# Patient Record
Sex: MALE | URBAN - NONMETROPOLITAN AREA
[De-identification: names, ages, dates, MRNs, and addresses within clinical notes are randomized per-mention and may not be internally consistent; named-entity substitution may affect disease eponyms.]

---

## 2021-06-14 ENCOUNTER — APPOINTMENT (RX ONLY)
Dept: URBAN - NONMETROPOLITAN AREA CLINIC 9 | Facility: CLINIC | Age: 56
Setting detail: DERMATOLOGY
End: 2021-06-14

## 2021-06-14 VITALS — SYSTOLIC BLOOD PRESSURE: 170 MMHG | DIASTOLIC BLOOD PRESSURE: 90 MMHG

## 2021-06-14 DIAGNOSIS — F17.200 NICOTINE DEPENDENCE, UNSPECIFIED, UNCOMPLICATED: ICD-10-CM

## 2021-06-14 DIAGNOSIS — D485 NEOPLASM OF UNCERTAIN BEHAVIOR OF SKIN: ICD-10-CM | Status: INADEQUATELY CONTROLLED

## 2021-06-14 DIAGNOSIS — R03.0 ELEVATED BLOOD-PRESSURE READING, WITHOUT DIAGNOSIS OF HYPERTENSION: ICD-10-CM

## 2021-06-14 PROBLEM — D48.5 NEOPLASM OF UNCERTAIN BEHAVIOR OF SKIN: Status: ACTIVE | Noted: 2021-06-14

## 2021-06-14 PROCEDURE — ? INCISIONAL BIOPSY

## 2021-06-14 PROCEDURE — 11106 INCAL BX SKN SINGLE LES: CPT

## 2021-06-14 PROCEDURE — 99202 OFFICE O/P NEW SF 15 MIN: CPT | Mod: 25

## 2021-06-14 PROCEDURE — ? COUNSELING

## 2021-06-14 ASSESSMENT — LOCATION ZONE DERM: LOCATION ZONE: ARM

## 2021-06-14 ASSESSMENT — LOCATION SIMPLE DESCRIPTION DERM: LOCATION SIMPLE: LEFT FOREARM

## 2021-06-14 ASSESSMENT — LOCATION DETAILED DESCRIPTION DERM: LOCATION DETAILED: LEFT DISTAL DORSAL FOREARM

## 2021-06-14 NOTE — PROCEDURE: INCISIONAL BIOPSY
Detail Level: Detailed
Scalpel Type: 15 blade
Biopsy Type: H and E
Depth Of Biopsy: adipose tissue
Was An Eye Clamp Used?: No
Eye Clamp Note Details: An eye clamp was used during the procedure.
Anesthesia Type: 0.5% lidocaine with 1:100,000 epinephrine and a 1:10 solution of 8.4% sodium bicarbonate
Anesthesia Volume In Cc: 1.5
Hemostasis: Pressure
Epidermal Sutures: 4-0 Nylon
Wound Care: Petrolatum
Suture Removal: 7 days
Size Of Lesion In Cm (Optional): 0
Lab: [id number]
Lab Facility: [id number]
Path Notes (To The Dermatopathologist): Please section longitudinally.  DO NOT BREADLOAF.  Patient woke one morning about 1 month ago with what he thought was a spider bite. Today it appears to be an SCC.
Consent: Written consent was obtained and risks were reviewed including but not limited to scarring, infection, bleeding, scabbing, incomplete removal, nerve damage and allergy to anesthesia.
Post-Care Instructions: I reviewed with the patient in detail post-care instructions. Patient is to keep the biopsy site dry overnight, and then apply bacitracin twice daily until healed. Patient may apply hydrogen peroxide soaks to remove any crusting.
Notification Instructions: Patient will be notified of biopsy results. However, patient instructed to call the office if not contacted within 2 weeks.
Billing Type: Third-Party Bill

## 2021-06-14 NOTE — HPI: RASH (SPIDER BITE)
How Severe Is It?: moderate
Is This A New Presentation, Or A Follow-Up?: Rash
Additional History: When there was a scab in the area it was painful but now that the scab has come off it doesn’t hurt.  He attempted to drain early in after the bite but never expressed anything from the site.

## 2021-06-21 ENCOUNTER — APPOINTMENT (RX ONLY)
Dept: URBAN - NONMETROPOLITAN AREA CLINIC 9 | Facility: CLINIC | Age: 56
Setting detail: DERMATOLOGY
End: 2021-06-21

## 2021-06-21 DIAGNOSIS — Z48.817 ENCOUNTER FOR SURGICAL AFTERCARE FOLLOWING SURGERY ON THE SKIN AND SUBCUTANEOUS TISSUE: ICD-10-CM

## 2021-06-21 PROBLEM — C44.629 SQUAMOUS CELL CARCINOMA OF SKIN OF LEFT UPPER LIMB, INCLUDING SHOULDER: Status: ACTIVE | Noted: 2021-06-21

## 2021-06-21 PROCEDURE — ? CONSULTATION FOR MOHS SURGERY

## 2021-06-21 PROCEDURE — 99214 OFFICE O/P EST MOD 30 MIN: CPT

## 2021-06-21 PROCEDURE — ? PRESCRIPTION

## 2021-06-21 PROCEDURE — ? POST-OP WOUND CHECK (NO GLOBAL PERIOD)

## 2021-06-21 RX ORDER — CEPHALEXIN 500 MG/1
CAPSULE ORAL
Qty: 4 | Refills: 0 | Status: ERX | COMMUNITY
Start: 2021-06-21

## 2021-06-21 RX ADMIN — CEPHALEXIN: 500 CAPSULE ORAL at 00:00

## 2021-06-21 ASSESSMENT — LOCATION SIMPLE DESCRIPTION DERM: LOCATION SIMPLE: LEFT FOREARM

## 2021-06-21 ASSESSMENT — LOCATION DETAILED DESCRIPTION DERM: LOCATION DETAILED: LEFT DISTAL DORSAL FOREARM

## 2021-06-21 ASSESSMENT — LOCATION ZONE DERM: LOCATION ZONE: ARM

## 2021-06-21 NOTE — PROCEDURE: CONSULTATION FOR MOHS SURGERY
Detail Level: Detailed
Size Of Lesion: 3
X Size Of Lesion In Cm (Optional): 0
Date Scheduled For Mohs (Optional): 6-23-21
Incorporate Mauc In Note: Yes

## 2021-06-23 ENCOUNTER — APPOINTMENT (RX ONLY)
Dept: URBAN - NONMETROPOLITAN AREA CLINIC 9 | Facility: CLINIC | Age: 56
Setting detail: DERMATOLOGY
End: 2021-06-23

## 2021-06-23 VITALS — SYSTOLIC BLOOD PRESSURE: 162 MMHG | DIASTOLIC BLOOD PRESSURE: 84 MMHG | HEART RATE: 82 BPM

## 2021-06-23 PROBLEM — C44.629 SQUAMOUS CELL CARCINOMA OF SKIN OF LEFT UPPER LIMB, INCLUDING SHOULDER: Status: ACTIVE | Noted: 2021-06-23

## 2021-06-23 PROCEDURE — 14302 TIS TRNFR ADDL 30 SQ CM: CPT

## 2021-06-23 PROCEDURE — 15220 FTH/GFT FR S/A/L 20 SQ CM/<: CPT

## 2021-06-23 PROCEDURE — 17313 MOHS 1 STAGE T/A/L: CPT

## 2021-06-23 PROCEDURE — 14301 TIS TRNFR ANY 30.1-60 SQ CM: CPT

## 2021-06-23 PROCEDURE — ? MOHS SURGERY

## 2021-06-23 NOTE — PROCEDURE: MOHS SURGERY
Body Location Override (Optional - Billing Will Still Be Based On Selected Body Map Location If Applicable): left distal dorsal forearm
Mohs Case Number: [id number]
Date Of Previous Biopsy (Optional): 06-04-21
Previous Accession (Optional): [id number]
Biopsy Photograph Reviewed: Yes
Consent Type: Consent 1 (Standard)
Eye Shield Used: No
Surgeon Performing Repair (Optional): Jeovany Cueva MD
Initial Size Of Lesion: 2.3
X Size Of Lesion In Cm (Optional): 2.9
Number Of Stages: 1
Primary Defect Length In Cm (Final Defect Size - Required For Flaps/Grafts): 2.8
Primary Defect Width In Cm (Final Defect Size - Required For Flaps/Grafts): 3.4
Repair Type: Flap and Graft
Oculoplastic Surgeon Procedure Text (A): After obtaining clear surgical margins the patient was sent to oculoplastics for surgical repair.  The patient understands they will receive post-surgical care and follow-up from the referring physician's office.
Otolaryngologist Procedure Text (A): After obtaining clear surgical margins the patient was sent to otolaryngology for surgical repair.  The patient understands they will receive post-surgical care and follow-up from the referring physician's office.
Plastic Surgeon Procedure Text (A): After obtaining clear surgical margins the patient was sent to plastics for surgical repair.  The patient understands they will receive post-surgical care and follow-up from the referring physician's office.
Mid-Level Procedure Text (A): After obtaining clear surgical margins the patient was sent to a mid-level provider for surgical repair.  The patient understands they will receive post-surgical care and follow-up from the mid-level provider.
Provider Procedure Text (A): After obtaining clear surgical margins the defect was repaired by another provider.
Asc Procedure Text (A): After obtaining clear surgical margins the patient was sent to an ASC for surgical repair.  The patient understands they will receive post-surgical care and follow-up from the ASC physician.
Suturegard Retention Suture: 2-0 Nylon
Retention Suture Bite Size: 3 mm
Length To Time In Minutes Device Was In Place: 10
Simple / Intermediate / Complex Repair - Final Wound Length In Cm: 0
Undermining Type: Entire Wound
Debridement Text: The wound edges were debrided prior to proceeding with the closure to facilitate wound healing.
Helical Rim Text: The closure involved the helical rim.
Vermilion Border Text: The closure involved the vermilion border.
Nostril Rim Text: The closure involved the nostril rim.
Retention Suture Text: Retention sutures were placed to support the closure and prevent dehiscence.
Flap Type: O-Z Flap
Secondary Defect Length In Cm (Required For Flaps): 6.4
Secondary Defect Width In Cm (Required For Flaps): 9.3
Additional Graft Type: Full Thickness Skin Graft
Additional Primary Defect Length (In Cm): 1.5
Additional Primary Defect Width (In Cm): 2.1
Patient Specific Indications Override: rapid onset and growth.
Area H Indication Text: Tumors in this location are included in Area H (eyelids, eyebrows, nose, lips, chin, ear, pre-auricular, post-auricular, temple, genitalia, hands, feet, ankles and areola).  Tissue conservation is critical in these anatomic locations.
Area M Indication Text: Tumors in this location are included in Area M (cheek, forehead, scalp, neck, jawline and pretibial skin).  Mohs surgery is indicated for tumors in these anatomic locations.
Area L Indication Text: Tumors in this location are included in Area L (trunk and extremities).  Mohs surgery is indicated for larger tumors, or tumors with aggressive histologic features, in these anatomic locations.
Tumor Debulked?: not debulked
Depth Of Tumor Invasion (For Histology): adipose tissue
Perineural Invasion (For Histology - Be Specific If Possible): absent
Stage 1: Number Of Blocks?: 4
Special Stains Stage 1 - Results: Base On Clearance Noted Above
Stage 2: Additional Anesthesia Type: 1% lidocaine with epinephrine
Staging Info: By selecting yes to the question above you will include information on AJCC 8 tumor staging in your Mohs note. Information on tumor staging will be automatically added for SCCs on the head and neck. AJCC 8 includes tumor size, tumor depth, perineural involvement and bone invasion.
Tumor Depth: Less than 6mm from granular layer and no invasion beyond the subcutaneous fat
Was The Patient On Physician Recommended Anticoagulation Therapy?: Please Select the Appropriate Response
Medical Necessity Statement: Based on my medical judgement, Mohs surgery is the most appropriate treatment for this cancer compared to other treatments.
Alternatives Discussed Intro (Do Not Add Period): I discussed alternative treatments to Mohs surgery and specifically discussed the risks and benefits of
Consent 1/Introductory Paragraph: The rationale for Mohs was explained to the patient and consent was obtained. The risks, benefits and alternatives to therapy were discussed in detail. Specifically, the risks of infection, scarring, bleeding, prolonged wound healing, incomplete removal, allergy to anesthesia, nerve injury and recurrence were addressed. Prior to the procedure, the treatment site was clearly identified and confirmed by the patient. All components of Universal Protocol/PAUSE Rule completed.
Consent 2/Introductory Paragraph: Mohs surgery was explained to the patient and consent was obtained. The risks, benefits and alternatives to therapy were discussed in detail. Specifically, the risks of infection, scarring, bleeding, prolonged wound healing, incomplete removal, allergy to anesthesia, nerve injury and recurrence were addressed. Prior to the procedure, the treatment site was clearly identified and confirmed by the patient. All components of Universal Protocol/PAUSE Rule completed.
Consent 3/Introductory Paragraph: I gave the patient a chance to ask questions they had about the procedure.  Following this I explained the Mohs procedure and consent was obtained. The risks, benefits and alternatives to therapy were discussed in detail. Specifically, the risks of infection, scarring, bleeding, prolonged wound healing, incomplete removal, allergy to anesthesia, nerve injury and recurrence were addressed. Prior to the procedure, the treatment site was clearly identified and confirmed by the patient. All components of Universal Protocol/PAUSE Rule completed.
Consent (Temporal Branch)/Introductory Paragraph: The rationale for Mohs was explained to the patient and consent was obtained. The risks, benefits and alternatives to therapy were discussed in detail. Specifically, the risks of damage to the temporal branch of the facial nerve, infection, scarring, bleeding, prolonged wound healing, incomplete removal, allergy to anesthesia, and recurrence were addressed. Prior to the procedure, the treatment site was clearly identified and confirmed by the patient. All components of Universal Protocol/PAUSE Rule completed.
Consent (Marginal Mandibular)/Introductory Paragraph: The rationale for Mohs was explained to the patient and consent was obtained. The risks, benefits and alternatives to therapy were discussed in detail. Specifically, the risks of damage to the marginal mandibular branch of the facial nerve, infection, scarring, bleeding, prolonged wound healing, incomplete removal, allergy to anesthesia, and recurrence were addressed. Prior to the procedure, the treatment site was clearly identified and confirmed by the patient. All components of Universal Protocol/PAUSE Rule completed.
Consent (Spinal Accessory)/Introductory Paragraph: The rationale for Mohs was explained to the patient and consent was obtained. The risks, benefits and alternatives to therapy were discussed in detail. Specifically, the risks of damage to the spinal accessory nerve, infection, scarring, bleeding, prolonged wound healing, incomplete removal, allergy to anesthesia, and recurrence were addressed. Prior to the procedure, the treatment site was clearly identified and confirmed by the patient. All components of Universal Protocol/PAUSE Rule completed.
Consent (Near Eyelid Margin)/Introductory Paragraph: The rationale for Mohs was explained to the patient and consent was obtained. The risks, benefits and alternatives to therapy were discussed in detail. Specifically, the risks of ectropion or eyelid deformity, infection, scarring, bleeding, prolonged wound healing, incomplete removal, allergy to anesthesia, nerve injury and recurrence were addressed. Prior to the procedure, the treatment site was clearly identified and confirmed by the patient. All components of Universal Protocol/PAUSE Rule completed.
Consent (Ear)/Introductory Paragraph: The rationale for Mohs was explained to the patient and consent was obtained. The risks, benefits and alternatives to therapy were discussed in detail. Specifically, the risks of ear deformity, infection, scarring, bleeding, prolonged wound healing, incomplete removal, allergy to anesthesia, nerve injury and recurrence were addressed. Prior to the procedure, the treatment site was clearly identified and confirmed by the patient. All components of Universal Protocol/PAUSE Rule completed.
Consent (Nose)/Introductory Paragraph: The rationale for Mohs was explained to the patient and consent was obtained. The risks, benefits and alternatives to therapy were discussed in detail. Specifically, the risks of nasal deformity, changes in the flow of air through the nose, infection, scarring, bleeding, prolonged wound healing, incomplete removal, allergy to anesthesia, nerve injury and recurrence were addressed. Prior to the procedure, the treatment site was clearly identified and confirmed by the patient. All components of Universal Protocol/PAUSE Rule completed.
Consent (Lip)/Introductory Paragraph: The rationale for Mohs was explained to the patient and consent was obtained. The risks, benefits and alternatives to therapy were discussed in detail. Specifically, the risks of lip deformity, changes in the oral aperture, infection, scarring, bleeding, prolonged wound healing, incomplete removal, allergy to anesthesia, nerve injury and recurrence were addressed. Prior to the procedure, the treatment site was clearly identified and confirmed by the patient. All components of Universal Protocol/PAUSE Rule completed.
Consent (Scalp)/Introductory Paragraph: The rationale for Mohs was explained to the patient and consent was obtained. The risks, benefits and alternatives to therapy were discussed in detail. Specifically, the risks of changes in hair growth pattern secondary to repair, infection, scarring, bleeding, prolonged wound healing, incomplete removal, allergy to anesthesia, nerve injury and recurrence were addressed. Prior to the procedure, the treatment site was clearly identified and confirmed by the patient. All components of Universal Protocol/PAUSE Rule completed.
Detail Level: Detailed
Postop Diagnosis: same
Anesthesia Type: 1% lidocaine with epinephrine and a 1:10 solution of 8.4% sodium bicarbonate
Additional Anesthesia Type: 0.5% bupivacaine
Additional Anesthesia Volume In Cc: 5
Hemostasis: Pressure
Estimated Blood Loss (Cc): minimal
Repair Anesthesia Method: local infiltration
Anesthesia Volume In Cc: 16
Brow Lift Text: A midfrontal incision was made medially to the defect to allow access to the tissues just superior to the left eyebrow. Following careful dissection inferiorly in a supraperiosteal plane to the level of the left eyebrow, several 3-0 monocryl sutures were used to resuspend the eyebrow orbicularis oculi muscular unit to the superior frontal bone periosteum. This resulted in an appropriate reapproximation of static eyebrow symmetry and correction of the left brow ptosis.
Deep Sutures: 3-0 Vicryl
Epidermal Closure: running
Suturegard Intro: Intraoperative tissue expansion was performed, utilizing the SUTUREGARD device, in order to reduce wound tension.
Suturegard Body: The suture ends were repeatedly re-tightened and re-clamped to achieve the desired tissue expansion.
Hemigard Intro: Due to skin fragility and wound tension, it was decided to use HEMIGARD adhesive retention suture devices to permit a linear closure. The skin was cleaned and dried for a 6cm distance away from the wound. Excessive hair, if present, was removed to allow for adhesion.
Hemigard Postcare Instructions: The HEMIGARD strips are to remain completely dry for at least 5-7 days.
Donor Site Anesthesia Type: same as repair anesthesia
Graft Donor Site Bandage (Optional-Leave Blank If You Don't Want In Note): Locally developed flap was dressed with Vaseline and dressing were applied to the donor site.
Closure 2 Information: This tab is for additional flaps and grafts, including complex repair and grafts and complex repair and flaps. You can also specify a different location for the additional defect, if the location is the same you do not need to select a new one. We will insert the automated text for the repair you select below just as we do for solitary flaps and grafts. Please note that at this time if you select a location with a different insurance zone you will need to override the ICD10 and CPT if appropriate.
Closure 3 Information: This tab is for additional flaps and grafts above and beyond our usual structured repairs.  Please note if you enter information here it will not currently bill and you will need to add the billing information manually.
Wound Care: Petrolatum
dressing with hypoallergenic tape
Suture Removal: 7 days
Unna Boot Text: An Unna boot was placed to help immobilize the limb and facilitate more rapid healing.
Home Suture Removal Text: Patient was provided instructions on removing sutures and will remove their sutures at home.  If they have any questions or difficulties they will call the office.
Post-Care Instructions: I reviewed with the patient in detail post-care instructions. Patient is to cleanse the area twice daily with hydrogen peroxide and/or diluted vinegar, then apply Vaseline and cover with bandage.  Keep the surgical area elevated above the level of the heart when possible to minimize swelling and control bleeding.  Limit activity that would increase pressure or tension to the surgical area.  This might include stooping to tie shoes, lifting over 10 pounds, or making lunging motions such as vacuuming.  Follow activity limitation discussed at your appointment.
Pain Refusal Text: I offered to prescribe pain medication but the patient refused to take this medication.
Mauc Instructions: By selecting yes to the question below the MAUC number will be added into the note.  This will be calculated automatically based on the diagnosis chosen, the size entered, the body zone selected (H,M,L) and the specific indications you chose. You will also have the option to override the Mohs AUC if you disagree with the automatically calculated number and this option is found in the Case Summary tab.
Where Do You Want The Question To Include Opioid Counseling Located?: Case Summary Tab
Eye Protection Verbiage: Before proceeding with the stage, a plastic scleral shield was inserted. The globe was anesthetized with a few drops of 1% lidocaine with 1:100,000 epinephrine. Then, an appropriate sized scleral shield was chosen and coated with lacrilube ointment. The shield was gently inserted and left in place for the duration of each stage. After the stage was completed, the shield was gently removed.
Mohs Method Verbiage: An incision at a 45 degree angle following the standard Mohs approach was done and the specimen was harvested as a microscopic controlled layer.
Surgeon/Pathologist Verbiage (Will Incorporate Name Of Surgeon From Intro If Not Blank): operated in two distinct and integrated capacities as the surgeon and pathologist.
Mohs Histo Method Verbiage: Each section was then chromacoded and processed in the Mohs lab using the Mohs protocol and submitted for frozen section.
Subsequent Stages Histo Method Verbiage: Using a similar technique to that described above, a thin layer of tissue was removed from all areas where tumor was visible on the previous stage.  The tissue was again oriented, mapped, dyed, and processed as above.
Mohs Rapid Report Verbiage: The area of clinically evident tumor was marked with skin marking ink and appropriately hatched.  The initial incision was made following the Mohs approach through the skin.  The specimen was taken to the lab, divided into the necessary number of pieces, chromacoded and processed according to the Mohs protocol.  This was repeated in successive stages until a tumor free defect was achieved.
Complex Repair Preamble Text (Leave Blank If You Do Not Want): Extensive wide undermining was performed.
Intermediate Repair Preamble Text (Leave Blank If You Do Not Want): Undermining was performed with blunt dissection.
Non-Graft Cartilage Fenestration Text: The cartilage was fenestrated with a 2mm punch biopsy to help facilitate healing.
Graft Cartilage Fenestration Text: The cartilage was fenestrated with a 2mm punch biopsy to help facilitate graft survival and healing.
Secondary Intention Text (Leave Blank If You Do Not Want): The defect will heal with secondary intention.
No Repair - Repaired With Adjacent Surgical Defect Text (Leave Blank If You Do Not Want): After obtaining clear surgical margins the defect was repaired concurrently with another surgical defect which was in close approximation.
Advancement Flap (Single) Text: The defect edges were sharply verticalized.  Given the location of the defect and the availability of adjacent tissue a single advancement flap was deemed most appropriate.  An appropriate advancement flap was developed to repair the defect. Incisions were placed within the relaxed skin tension lines where possible.  The development of flap included sharp incision to an appropriate depth of adipose tissue.  Dissection was bluntly performed in an appropriate subcutaneous fascial plane.  The flap was moved into place and margins approximated employing layered suturing techniques as outlined.
Advancement Flap (Double) Text: The defect edges were sharply verticalized.  Given the location of the defect and the availability of adjacent tissue a double advancement flap was deemed most appropriate.  The appropriate advancement flaps were developed to repair the defect.  Incisions were placed within the relaxed skin tension lines where possible.  The development of flaps included sharp incision to an appropriate depth of adipose tissue.  Dissection was bluntly performed in an appropriate subcutaneous fascial plane.  The flaps were moved into place and margins approximated employing layered suturing techniques as outlined.
Burow's Advancement Flap Text: The defect edges were sharply verticalized.  Given the location of the defect and the availability of adjacent tissue a Burow's advancement flap was deemed most appropriate.  The appropriate advancement flap was developed to repair the defect.  Incisions were placed within the relaxed skin tension lines where possible.  The development of flap included sharp incision to an appropriate depth of adipose tissue.  Dissection was bluntly performed in an appropriate subcutaneous fascial plane.  The flap was moved into place and margins approximated employing layered suturing techniques as outlined.
Chonodrocutaneous Helical Advancement Flap Text: The defect edges were sharply verticalized.  Given the location of the defect and the availability of adjacent tissue a chondrocutaneous helical advancement flap was deemed most appropriate.  The appropriate advancement flap was developed to repair the defect.  Incisions were placed within the relaxed skin tension lines where possible.  The development of flap included sharp incision to an appropriate depth of adipose tissue.  Dissection was bluntly performed in an appropriate subcutaneous fascial plane.  The flap was moved into place and margins approximated employing layered suturing techniques as outlined.
Crescentic Advancement Flap Text: The defect edges were sharply verticalized.  Given the location of the defect and the availability of adjacent tissue a crescentic advancement flap was deemed most appropriate.  The appropriate advancement flap was developed to repair the defect.  Incisions were placed within the relaxed skin tension lines where possible.  The development of flap included sharp incision to an appropriate depth of adipose tissue.  Dissection was bluntly performed in an appropriate subcutaneous fascial plane.  The flap was moved into place and margins approximated employing layered suturing techniques as outlined.
A-T Advancement Flap Text: The defect edges were sharply verticalized.  Given the location of the defect, shape of the defect and the availability of adjacent tissue an A-T advancement flap was deemed most appropriate.  An appropriate advancement flap was developed to repair the defect.  Incisions were placed within the relaxed skin tension lines where possible.  The development of flap included sharp incision to an appropriate depth of adipose tissue.  Dissection was bluntly performed in an appropriate subcutaneous fascial plane.  The flap was moved into place and margins approximated employing layered suturing techniques as outlined.
O-T Advancement Flap Text: The defect edges were sharply verticalized.  Given the location of the defect, shape of the defect and the availability of adjacent tissue an O-T advancement flap was deemed most appropriate.  An appropriate advancement flap was developed to repair the defect.  Incisions were placed within the relaxed skin tension lines where possible.  The development of flap included sharp incision to an appropriate depth of adipose tissue.  Dissection was bluntly performed in an appropriate subcutaneous fascial plane.  The flap was moved into place and margins approximated employing layered suturing techniques as outlined.
O-L Flap Text: The defect edges were sharply verticalized.  Given the location of the defect, shape of the defect and the availability of adjacent tissue an O-L flap was deemed most appropriate.  An appropriate advancement flap was developed to repair the defect.  Incisions were placed within the relaxed skin tension lines where possible.  The development of flap included sharp incision to an appropriate depth of adipose tissue.  Dissection was bluntly performed in an appropriate subcutaneous fascial plane.  The flap was moved into place and margins approximated employing layered suturing techniques as outlined.
O-Z Flap Text: The defect edges were sharply verticalized.  Given the location of the defect, shape of the defect and the availability of adjacent tissue an O-Z flap was deemed most appropriate.  An appropriate transposition flap was developed to repair the defect.  Incisions were placed within the relaxed skin tension lines where possible.  The development of flap included sharp incision to an appropriate depth of adipose tissue.  Dissection was bluntly performed in an appropriate subcutaneous fascial plane.  The flap was moved into place and margins approximated employing layered suturing techniques as outlined.
Double O-Z Flap Text: The defect edges were sharply verticalized.  Given the location of the defect, shape of the defect and the availability of adjacent tissue a Double O-Z flap was deemed most appropriate.  An appropriate transposition flap was developed to repair the defect.  Incisions were placed within the relaxed skin tension lines where possible.  The development of flap included sharp incision to an appropriate depth of adipose tissue.  Dissection was bluntly performed in an appropriate subcutaneous fascial plane.  The flap was moved into place and margins approximated employing layered suturing techniques as outlined.
V-Y Flap Text: The defect edges were sharply verticalized.  Given the location of the defect, shape of the defect and the availability of adjacent tissue a V-Y flap was deemed most appropriate.  An appropriate advancement flap was developed to repair the defect.  Incisions were placed within the relaxed skin tension lines where possible.  The development of flap included sharp incision to an appropriate depth of adipose tissue.  Dissection was bluntly performed in an appropriate subcutaneous fascial plane.  The flap was moved into place and margins approximated employing layered suturing techniques as outlined.
Advancement-Rotation Flap Text: The defect edges were sharply verticalized.  Given the location of the defect, shape of the defect and the availability of adjacent tissue an advancement-rotation flap was deemed most appropriate.  An appropriate flap was developed to repair the defect.  Incisions were placed within the relaxed skin tension lines where possible.  The development of flap included sharp incision to an appropriate depth of adipose tissue.  Dissection was bluntly performed in an appropriate subcutaneous fascial plane.  The flap was moved into place and margins approximated employing layered suturing techniques as outlined.
Mercedes Flap Text: The defect edges were sharply verticalized.  Given the location of the defect, shape of the defect and the availability of adjacent tissue a Mercedes flap was deemed most appropriate.  An appropriate advancement flap was developed to repair the defect.  Incisions were placed within the relaxed skin tension lines where possible.  The development of flap included sharp incision to an appropriate depth of adipose tissue.  Dissection was bluntly performed in an appropriate subcutaneous fascial plane.  The flap was moved into place and margins approximated employing layered suturing techniques as outlined.
Modified Advancement Flap Text: The defect edges were sharply verticalized.  Given the location of the defect, shape of the defect and the availability of adjacent tissue a modified advancement flap was deemed most appropriate.  An appropriate advancement flap was developed to repair the defect.  Incisions were placed within the relaxed skin tension lines where possible.  The development of flap included sharp incision to an appropriate depth of adipose tissue.  Dissection was bluntly performed in an appropriate subcutaneous fascial plane.  The flap was moved into place and margins approximated employing layered suturing techniques as outlined.
Mucosal Advancement Flap Text: Given the location of the defect, shape of the defect and the availability of adjacent tissue a mucosal advancement flap was deemed most appropriate. Incisions were made with a 15 blade scalpel in the appropriate fashion along the cutaneous vermilion border and the mucosal lip. The remaining actinically damaged mucosal tissue was excised.  The mucosal advancement flap was then elevated to the gingival sulcus with care taken to preserve the neurovascular structures and advanced into the primary defect. Care was taken to ensure that precise realignment of the vermilion border was achieved.
Peng Advancement Flap Text: The defect edges were debeveled with a #15 scalpel blade.  Given the location of the defect, shape of the defect and the proximity to free margins a Peng advancement flap was deemed most appropriate.  Using a sterile surgical marker, an appropriate advancement flap was drawn incorporating the defect and placing the expected incisions within the relaxed skin tension lines where possible. The area thus outlined was incised deep to adipose tissue with a #15 scalpel blade.  The skin margins were undermined to an appropriate distance in all directions utilizing iris scissors.
Hatchet Flap Text: The defect edges were sharply verticalized.  Given the location of the defect, shape of the defect and the availability of adjacent tissue a hatchet flap was deemed most appropriate.  An appropriate hatchet flap was developed to repair the defect.  Incisions were placed within the relaxed skin tension lines where possible.  The development of flap included sharp incision to an appropriate depth of adipose tissue.  Dissection was bluntly performed in an appropriate subcutaneous fascial plane.  The flap was moved into place and margins approximated employing layered suturing techniques as outlined.
Rotation Flap Text: The defect edges were sharply verticalized.  Given the location of the defect, shape of the defect and the availability of adjacent tissue a rotation flap was deemed most appropriate.  An appropriate rotation flap was developed to repair the defect.  Incisions were placed within the relaxed skin tension lines where possible.  The development of flap included sharp incision to an appropriate depth of adipose tissue.  Dissection was bluntly performed in an appropriate subcutaneous fascial plane.  The flap was moved into place and margins approximated employing layered suturing techniques as outlined.
Spiral Flap Text: The defect edges were sharply verticalized.  Given the location of the defect, shape of the defect and the availability of adjacent tissue a spiral flap was deemed most appropriate.  An appropriate rotation flap was developed to repair the defect.  Incisions were placed within the relaxed skin tension lines where possible.  The development of flap included sharp incision to an appropriate depth of adipose tissue.  Dissection was bluntly performed in an appropriate subcutaneous fascial plane.  The flap was moved into place and margins approximated employing layered suturing techniques as outlined.
Star Wedge Flap Text: The defect edges were sharply verticalized.  Given the location of the defect, shape of the defect and the availability of adjacent tissue a star wedge flap was deemed most appropriate.  An appropriate rotation flap was developed to repair the defect.  Incisions were placed within the relaxed skin tension lines where possible.  The development of flap included sharp incision to an appropriate depth of adipose tissue.  Dissection was bluntly performed in an appropriate subcutaneous fascial plane.  The flap was moved into place and margins approximated employing layered suturing techniques as outlined.
Transposition Flap Text: The defect edges were sharply verticalized.  Given the location of the defect and the availability of adjacent tissue a transposition flap was deemed most appropriate.  An appropriate transposition flap was developed to repair the defect.  Incisions were placed within the relaxed skin tension lines where possible.  The development of flap included sharp incision to an appropriate depth of adipose tissue.  Dissection was bluntly performed in an appropriate subcutaneous fascial plane.  The flap was moved into place and margins approximated employing layered suturing techniques as outlined.
Muscle Hinge Flap Text: The defect edges were sharply verticalized.  Given the size, depth and location of the defect and the availability of adjacent tissue a muscle hinge flap was deemed most appropriate.  An appropriate hinge flap was developed to repair the defect.  Incisions were placed within the relaxed skin tension lines where possible.  The development of flap included sharp incision to an appropriate depth of adipose tissue.  Dissection was bluntly performed in an appropriate subcutaneous fascial plane.  The flap was moved into place and margins approximated employing layered suturing techniques as outlined.
Nasal Turnover Hinge Flap Text: The defect edges were debeveled with a #15 scalpel blade.  Given the size, depth, location of the defect and the defect being full thickness a nasal turnover hinge flap was deemed most appropriate.  Using a sterile surgical marker, an appropriate hinge flap was drawn incorporating the defect. The area thus outlined was incised with a #15 scalpel blade. The flap was designed to recreate the nasal mucosal lining and the alar rim. The skin margins were undermined to an appropriate distance in all directions utilizing iris scissors.
Nasalis-Muscle-Based Myocutaneous Island Pedicle Flap Text: Using a #15 blade, an incision was made around the donor flap to the level of the nasalis muscle. Wide lateral undermining was then performed in both the subcutaneous plane above the nasalis muscle, and in a submuscular plane just above periosteum. This allowed the formation of a free nasalis muscle axial pedicle (based on the angular artery) which was still attached to the actual cutaneous flap, increasing its mobility and vascular viability. Hemostasis was obtained with pinpoint electrocoagulation. The flap was mobilized into position and the pivotal anchor points positioned and stabilized with buried interrupted sutures. Subcutaneous and dermal tissues were closed in a multilayered fashion with sutures. Tissue redundancies were excised, and the epidermal edges were apposed without significant tension and sutured with sutures.
Orbicularis Oris Muscle Flap Text: The defect edges were debeveled with a #15 scalpel blade.  Given that the defect affected the competency of the oral sphincter an orbicularis oris muscle flap was deemed most appropriate to restore this competency and normal muscle function.  Using a sterile surgical marker, an appropriate flap was drawn incorporating the defect. The area thus outlined was incised with a #15 scalpel blade.
Melolabial Transposition Flap Text: The defect edges were sharply verticalized.  Given the location of the defect and the availability of adjacent tissue a melolabial flap was deemed most appropriate.  An appropriate melolabial transposition flap was developed to repair the defect.  Incisions were placed within the relaxed skin tension lines where possible.  The development of flap included sharp incision to an appropriate depth of adipose tissue.  Dissection was bluntly performed in an appropriate subcutaneous fascial plane.  The flap was moved into place and margins approximated employing layered suturing techniques as outlined.
Rhombic Flap Text: The defect edges were sharply verticalized.  Given the location of the defect and the availability of adjacent tissue a rhombic flap was deemed most appropriate.  An appropriate rhombic flap was developed to repair the defect. Incisions were placed within the relaxed skin tension lines where possible.  The development of flap included sharp incision to an appropriate depth of adipose tissue.  Dissection was bluntly performed in an appropriate subcutaneous fascial plane.  The flap was moved into place and margins approximated employing layered suturing techniques as outlined.
Rhomboid Transposition Flap Text: The defect edges were sharply verticalized.  Given the location of the defect and the availability of adjacent tissue a rhomboid transposition flap was deemed most appropriate.  An appropriate rhomboid flap was developed to repair the defect.  Incisions were placed within the relaxed skin tension lines where possible.  The development of flap included sharp incision to an appropriate depth of adipose tissue.  Dissection was bluntly performed in an appropriate subcutaneous fascial plane.  The flap was moved into place and margins approximated employing layered suturing techniques as outlined.
Bi-Rhombic Flap Text: The defect edges were sharply verticalized.  Given the location of the defect and the availability of adjacent tissue a bi-rhombic flap was deemed most appropriate.  An appropriate rhombic flap was developed to repair the defect.  Incisions were placed within the relaxed skin tension lines where possible.  The development of flap included sharp incision to an appropriate depth of adipose tissue.  Dissection was bluntly performed in an appropriate subcutaneous fascial plane.  The flap was moved into place and margins approximated employing layered suturing techniques as outlined.
Helical Rim Advancement Flap Text: The defect edges were sharply verticalized.  Given the location of the defect and the availability of adjacent tissue (helical rim) a double helical rim advancement flap was deemed most appropriate.  The appropriate advancement flaps were developed to repair the defect and placing the expected incisions between the helical rim and antihelix where possible.  The development of flaps included sharp incision to an appropriate depth of adipose tissue.  Dissection was bluntly performed in an appropriate subcutaneous fascial plane.  The flaps were moved into place and margins approximated employing layered suturing techniques as outlined.
Bilateral Helical Rim Advancement Flap Text: The defect edges were sharply verticalized.  Given the location of the defect and the availability of adjacent tissue (helical rim) a bilateral helical rim advancement flap was deemed most appropriate.  The appropriate advancement flaps were developed to repair the defect and placing the expected incisions between the helical rim and antihelix where possible.  The development of flaps included sharp incision to an appropriate depth of adipose tissue.  Dissection was bluntly performed in an appropriate subcutaneous fascial plane.  The flaps were moved into place and margins approximated employing layered suturing techniques as outlined.
Ear Star Wedge Flap Text: The defect edges were sharply verticalized.  Given the location of the defect and the availability of adjacent tissue (helical rim) an ear star wedge flap was deemed most appropriate.  The appropriate flap was developed to repair the defect and placing the expected incisions between the helical rim and antihelix where possible.  The development of flap included sharp incision to an appropriate depth of adipose tissue.  The flap was moved into place and margins approximated employing layered suturing techniques as outlined.
Banner Transposition Flap Text: The defect edges were sharply verticalized.  Given the location of the defect and the availability of adjacent tissue a Banner transposition flap was deemed most appropriate.  An appropriate flap developed to repair the defect.  Incisions were placed within the relaxed skin tension lines where possible.  The development of flap included sharp incision to an appropriate depth of adipose tissue.  Dissection was bluntly performed in an appropriate subcutaneous fascial plane.  The flap was moved into place and margins approximated employing layered suturing techniques as outlined.
Bilobed Flap Text: The defect edges were sharply verticalized.  Given the location of the defect and the availability of adjacent tissue a bilobe flap was deemed most appropriate.  An appropriate bilobe flap was developed to repair the defect.  Incisions were placed within the relaxed skin tension lines where possible.  The development of flap included sharp incision to an appropriate depth of adipose tissue.  Dissection was bluntly performed in an appropriate subcutaneous fascial plane.  The flap was moved into place and margins approximated employing layered suturing techniques as outlined.
Bilobed Transposition Flap Text: The defect edges were sharply verticalized.  Given the location of the defect and the availability of adjacent tissue a bilobed transposition flap was deemed most appropriate.  An appropriate bilobe flap was developed to repair the defect.  Incisions were placed within the relaxed skin tension lines where possible.  The development of flap included sharp incision to an appropriate depth of adipose tissue.  Dissection was bluntly performed in an appropriate subcutaneous fascial plane.  The flap was moved into place and margins approximated employing layered suturing techniques as outlined.
Trilobed Flap Text: The defect edges were sharply verticalized.  Given the location of the defect and the availability of adjacent tissue a trilobed flap was deemed most appropriate.  An appropriate trilobed flap was developed to repair the defect.  Incisions were placed within the relaxed skin tension lines where possible.  The development of flap included sharp incision to an appropriate depth of adipose tissue.  Dissection was bluntly performed in an appropriate subcutaneous fascial plane.  The flap was moved into place and margins approximated employing layered suturing techniques as outlined.
Dorsal Nasal Flap Text: The defect edges were sharply verticalized.  Given the location of the defect and the availability of adjacent tissue a dorsal nasal flap was deemed most appropriate.  An appropriate dorsal nasal flap was developed to repair the defect.  Incisions were placed within the relaxed skin tension lines where possible.  The development of flap included sharp incision to an appropriate depth of adipose tissue.  Dissection was bluntly performed in an appropriate subcutaneous fascial plane.  The flap was moved into place and margins approximated employing layered suturing techniques as outlined.
Island Pedicle Flap Text: The defect edges were sharply verticalized.  Given the location of the defect, shape of the defect and the availability of adjacent tissue an island pedicle advancement flap was deemed most appropriate.  An appropriate advancement flap was developed to repair the defect, outlining the appropriate donor tissue and placing the incisions within the relaxed skin tension lines where possible.  The development of flap included sharp incision to an appropriate depth of adipose tissue.  Dissection was bluntly performed in an appropriate subcutaneous fascial plane around the island pedicle.  There was minimal undermining beneath the pedicle flap.
Island Pedicle Flap With Canthal Suspension Text: The defect edges were sharply verticalized.  Given the location of the defect, shape of the defect and the availability of adjacent tissue an island pedicle advancement flap was deemed most appropriate.  An appropriate advancement flap was developed to repair the defect, outlining the appropriate donor tissue and placing the incisions within the relaxed skin tension lines where possible. The development of flap included sharp incision to an appropriate depth of adipose tissue.  Dissection was bluntly performed in an appropriate subcutaneous fascial plane around the primary defect and laterally outward around the island pedicle.  There was minimal undermining beneath the pedicle flap. A suspension suture was placed in the canthal tendon to prevent tension and prevent ectropion.
Alar Island Pedicle Flap Text: The defect edges were sharply verticalized.  Given the location of the defect, shape of the defect and the availability of adjacent tissue to the alar rim an island pedicle advancement flap was deemed most appropriate.  An appropriate advancement flap was developed to repair the defect, outlining the appropriate donor tissue and placing the incisions within the nasal ala running parallel to the alar rim. The development of flap included sharp incision to an appropriate depth of adipose tissue.  Dissection was bluntly performed in an appropriate subcutaneous fascial plane around the primary defect and laterally outward around the island pedicle.  There was minimal undermining beneath the pedicle flap.
Double Island Pedicle Flap Text: The defect edges were sharply verticalized.  Given the location of the defect, shape of the defect and the availability of adjacent tissue a double island pedicle advancement flap was deemed most appropriate.  An appropriate advancement flap was developed to repair the defect, outlining the appropriate donor tissue and placing the incisions within the relaxed skin tension lines where possible.    The development of flap included sharp incision to an appropriate depth of adipose tissue.  Dissection was bluntly performed in an appropriate subcutaneous fascial plane around the primary defect and laterally outward around the island pedicle.  There was minimal undermining beneath the pedicle flap.
Island Pedicle Flap-Requiring Vessel Identification Text: The defect edges were sharply verticalized.  Given the location of the defect, shape of the defect and the availability of adjacent tissue an island pedicle advancement flap was deemed most appropriate.  An appropriate advancement flap was developed, based on the axial vessel mentioned above, to repair the defect, outlining the appropriate donor tissue and placing the incisions within the relaxed skin tension lines where possible.  The development of flap included sharp incision to an appropriate depth of adipose tissue.  Dissection was bluntly performed in an appropriate subcutaneous fascial plane around the primary defect and laterally outward around the island pedicle.  There was minimal undermining beneath the pedicle flap.
Keystone Flap Text: The defect edges were sharply verticalized.  Given the location of the defect, shape of the defect and the availability of adjacent tissue a keystone flap was deemed most appropriate.  An appropriate keystone flap was developed to repair the defect, outlining the appropriate donor tissue and placing the incisions within the relaxed skin tension lines where possible. The development of flap included sharp incision to an appropriate depth of adipose tissue.  Dissection was bluntly performed in an appropriate subcutaneous fascial plane around the primary defect and laterally outward around the flap.
O-T Plasty Text: The defect edges were sharply verticalized.  Given the location of the defect, shape of the defect and the availability of adjacent tissue an O-T plasty was deemed most appropriate.  An appropriate O-T plasty was developed to repair the defect.  Incisions were placed within the relaxed skin tension lines where possible.  The development of flap included sharp incision to an appropriate depth of adipose tissue.  Dissection was bluntly performed in an appropriate subcutaneous fascial plane.  The flap was moved into place and margins approximated employing layered suturing techniques as outlined.
O-Z Plasty Text: The defect edges were sharply verticalized.  Given the location of the defect, shape of the defect and the availability of adjacent tissue an O-Z plasty (double transposition flap) was deemed most appropriate.  The appropriate transposition flaps were developed to repair the defect.  Incisions were placed within the relaxed skin tension lines where possible.  The development of flaps included sharp incision to an appropriate depth of adipose tissue.  Dissection was bluntly performed in an appropriate subcutaneous fascial plane.  The flaps were moved into place and margins approximated employing layered suturing techniques as outlined.
Double O-Z Plasty Text: The defect edges were sharply verticalized.  Given the location of the defect, shape of the defect and the availability of adjacent tissue a Double O-Z plasty (double transposition flap) was deemed most appropriate.  The appropriate transposition flaps were developed to repair the defect.  Incisions were placed within the relaxed skin tension lines where possible.  The development of flaps included sharp incision to an appropriate depth of adipose tissue.  Dissection was bluntly performed in an appropriate subcutaneous fascial plane.  The flaps were moved into place and margins approximated employing layered suturing techniques as outlined.
V-Y Plasty Text: The defect edges were sharply verticalized.  Given the location of the defect, shape of the defect and the availability of adjacent tissue an V-Y advancement flap was deemed most appropriate.  An appropriate advancement flap was developed to repair the defect.  Incisions were placed within the relaxed skin tension lines where possible.  The development of flap included sharp incision to an appropriate depth of adipose tissue.  Dissection was bluntly performed in an appropriate subcutaneous fascial plane.  The flap was moved into place and margins approximated employing layered suturing techniques as outlined.
H Plasty Text: Given the location of the defect, shape of the defect and the availability of adjacent tissue a H-plasty was deemed most appropriate for repair.  The appropriate advancement arms of the H-plasty were developed to repair the defect.  Incisions were placed within the relaxed skin tension lines where possible.  The development of flaps included sharp incision to an appropriate depth of adipose tissue.  Dissection was bluntly performed in an appropriate subcutaneous fascial plane.  The flaps were moved into place and margins approximated employing layered suturing techniques as outlined.
W Plasty Text: The defect edges were sharply verticalized.  Given the location of the defect, shape of the defect and the availability of adjacent tissue a W-plasty was deemed most appropriate for repair.  The appropriate transposition arms of the W-plasty were developed to repair the defect.  Incisions were placed within the relaxed skin tension lines where possible.  The development of flaps included sharp incision to an appropriate depth of adipose tissue.  Dissection was bluntly performed in an appropriate subcutaneous fascial plane.  The flaps were moved into place and margins approximated employing layered suturing techniques as outlined.
Z Plasty Text: The defect edges were sharply verticalized.  Given the location of the defect, shape of the defect and the availability of adjacent tissue a Z-plasty was deemed most appropriate for repair.  The appropriate transposition arms of the Z-plasty were developed to repair the defect.  Incisions were placed within the relaxed skin tension lines where possible.  The development of flaps included sharp incision to an appropriate depth of adipose tissue.  Dissection was bluntly performed in an appropriate subcutaneous fascial plane.  The flaps were moved into place and margins approximated employing layered suturing techniques as outlined.
Zygomaticofacial Flap Text: Given the location of the defect, shape of the defect and the proximity to free margins a zygomaticofacial flap was deemed most appropriate for repair.  Using a sterile surgical marker, the appropriate flap was drawn incorporating the defect and placing the expected incisions within the relaxed skin tension lines where possible. The area thus outlined was incised deep to adipose tissue with a #15 scalpel blade with preservation of a vascular pedicle.  The skin margins were undermined to an appropriate distance in all directions utilizing iris scissors.  The flap was then placed into the defect and anchored with interrupted buried subcutaneous sutures.
Cheek Interpolation Flap Text: A decision was made to reconstruct the defect utilizing an interpolation axial flap and a staged reconstruction.  A telfa template was made of the defect.  This telfa template was then used to outline the Cheek Interpolation flap.  The donor area for the pedicle flap was then injected with anesthesia.  The flap was excised through the skin and subcutaneous tissue down to the layer of the underlying musculature.  The interpolation flap was carefully excised within this deep plane to maintain its blood supply.  The edges of the donor site were undermined.   The donor site was closed in a primary fashion.  The pedicle was then rotated into position and sutured.  Once the tube was sutured into place, adequate blood supply was confirmed with blanching and refill.  The pedicle was then wrapped with xeroform gauze and dressed appropriately with a telfa and gauze bandage to ensure continued blood supply and protect the attached pedicle.
Cheek-To-Nose Interpolation Flap Text: A decision was made to reconstruct the defect utilizing an interpolation axial flap and a staged reconstruction.  A telfa template was made of the defect.  This telfa template was then used to outline the Cheek-To-Nose Interpolation flap.  The donor area for the pedicle flap was then injected with anesthesia.  The flap was excised through the skin and subcutaneous tissue down to the layer of the underlying musculature.  The interpolation flap was carefully excised within this deep plane to maintain its blood supply.  The edges of the donor site were undermined.   The donor site was closed in a primary fashion.  The pedicle was then rotated into position and sutured.  Once the tube was sutured into place, adequate blood supply was confirmed with blanching and refill.  The pedicle was then wrapped with xeroform gauze and dressed appropriately with a telfa and gauze bandage to ensure continued blood supply and protect the attached pedicle.
Interpolation Flap Text: A decision was made to reconstruct the defect utilizing an interpolation axial flap and a staged reconstruction.  A telfa template was made of the defect.  This telfa template was then used to outline the interpolation flap.  The donor area for the pedicle flap was then injected with anesthesia.  The flap was excised through the skin and subcutaneous tissue down to the layer of the underlying musculature.  The interpolation flap was carefully excised within this deep plane to maintain its blood supply.  The edges of the donor site were undermined.   The donor site was closed in a primary fashion.  The pedicle was then rotated into position and sutured.  Once the tube was sutured into place, adequate blood supply was confirmed with blanching and refill.  The pedicle was then wrapped with xeroform gauze and dressed appropriately with a telfa and gauze bandage to ensure continued blood supply and protect the attached pedicle.
Melolabial Interpolation Flap Text: A decision was made to reconstruct the defect utilizing an interpolation axial flap and a staged reconstruction.  A telfa template was made of the defect.  This telfa template was then used to outline the melolabial interpolation flap.  The donor area for the pedicle flap was then injected with anesthesia.  The flap was excised through the skin and subcutaneous tissue down to the layer of the underlying musculature.  The pedicle flap was carefully excised within this deep plane to maintain its blood supply.  The edges of the donor site were undermined.   The donor site was closed in a primary fashion.  The pedicle was then rotated into position and sutured.  Once the tube was sutured into place, adequate blood supply was confirmed with blanching and refill.  The pedicle was then wrapped with xeroform gauze and dressed appropriately with a telfa and gauze bandage to ensure continued blood supply and protect the attached pedicle.
Mastoid Interpolation Flap Text: A decision was made to reconstruct the defect utilizing an interpolation axial flap and a staged reconstruction.  A telfa template was made of the defect.  This telfa template was then used to outline the mastoid interpolation flap.  The donor area for the pedicle flap was then injected with anesthesia.  The flap was excised through the skin and subcutaneous tissue down to the layer of the underlying musculature.  The pedicle flap was carefully excised within this deep plane to maintain its blood supply.  The edges of the donor site were undermined.   The donor site was closed in a primary fashion.  The pedicle was then rotated into position and sutured.  Once the tube was sutured into place, adequate blood supply was confirmed with blanching and refill.  The pedicle was then wrapped with xeroform gauze and dressed appropriately with a telfa and gauze bandage to ensure continued blood supply and protect the attached pedicle.
Posterior Auricular Interpolation Flap Text: A decision was made to reconstruct the defect utilizing an interpolation axial flap and a staged reconstruction.  A telfa template was made of the defect.  This telfa template was then used to outline the posterior auricular interpolation flap.  The donor area for the pedicle flap was then injected with anesthesia.  The flap was excised through the skin and subcutaneous tissue down to the layer of the underlying musculature.  The pedicle flap was carefully excised within this deep plane to maintain its blood supply.  The edges of the donor site were undermined.   The donor site was closed in a primary fashion.  The pedicle was then rotated into position and sutured.  Once the tube was sutured into place, adequate blood supply was confirmed with blanching and refill.  The pedicle was then wrapped with xeroform gauze and dressed appropriately with a telfa and gauze bandage to ensure continued blood supply and protect the attached pedicle.
Paramedian Forehead Flap Text: A decision was made to reconstruct the defect utilizing an interpolation axial flap and a staged reconstruction.  A telfa template was made of the defect.  This telfa template was then used to outline the paramedian forehead pedicle flap.  The donor area for the pedicle flap was then injected with anesthesia.  The flap was excised through the skin and subcutaneous tissue down to the layer of the underlying musculature.  The pedicle flap was carefully excised within this deep plane to maintain its blood supply.  The edges of the donor site were undermined.   The donor site was closed in a primary fashion.  The pedicle was then rotated into position and sutured.  Once the tube was sutured into place, adequate blood supply was confirmed with blanching and refill.  The pedicle was then wrapped with xeroform gauze and dressed appropriately with a telfa and gauze bandage to ensure continued blood supply and protect the attached pedicle.
Cheiloplasty (Less Than 50%) Text: A decision was made to reconstruct the defect with a  cheiloplasty.  The defect was undermined extensively.  Additional obicularis oris muscle was excised with a 15 blade scalpel.  The defect was converted into a full thickness wedge, of less than 50% of the vertical height of the lip, to facilite a better cosmetic result.  Small vessels were then tied off with 5-0 monocyrl. The obicularis oris, superficial fascia, adipose and dermis were then reapproximated.  After the deeper layers were approximated the epidermis was reapproximated with particular care given to realign the vermilion border.
Cheiloplasty (Complex) Text: A decision was made to reconstruct the defect with a  cheiloplasty.  The defect was undermined extensively.  Additional obicularis oris muscle was excised with a 15 blade scalpel.  The defect was converted into a full thickness wedge to facilite a better cosmetic result.  Small vessels were then tied off with 5-0 monocyrl. The obicularis oris, superficial fascia, adipose and dermis were then reapproximated.  After the deeper layers were approximated the epidermis was reapproximated with particular care given to realign the vermilion border.
Ear Wedge Repair Text: A wedge excision was completed by carrying down an excision through the full thickness of the ear and cartilage with an inward facing Burow's triangle. The wound was then closed in a layered fashion.
Full Thickness Lip Wedge Repair (Flap) Text: Given the location of the defect and the proximity to free margins a full thickness wedge repair was deemed most appropriate.  Using a sterile surgical marker, the appropriate repair was drawn incorporating the defect and placing the expected incisions perpendicular to the vermilion border.  The vermilion border was also meticulously outlined to ensure appropriate reapproximation during the repair.  The area thus outlined was incised through and through with a #15 scalpel blade.  The muscularis and dermis were reaproximated with deep sutures following hemostasis. Care was taken to realign the vermilion border before proceeding with the superficial closure.  Once the vermilion was realigned the superfical and mucosal closure was finished.
Ftsg Text: The defect edges were sharply prepared to accept the intended graft tissue.  Given the location of the defect, shape of the defect and the availability of adjacent tissue a full thickness skin graft was deemed most appropriate.  The primary defect shape was measured and used to determine tissue requirements from donor site. The area was accordingly sharply incised to an appropriate depth of adipose tissue.  The harvested graft was then trimmed of adipose tissue until only dermis and epidermis were left.  The skin margins of the secondary defect were undermined to an appropriate distance in all directions utilizing blunt dissection.  The secondary defect was closed as outlined.  The skin graft was then sutured in place in a bolstered fashion.
Split-Thickness Skin Graft Text: The defect edges were sharply prepared to accept the intended graft tissue.  Given the location of the defect, shape of the defect and the availability of adjacent tissue a split thickness skin graft was deemed most appropriate.  The primary defect shappe was measured and used to determine tissue requirements from donor site. The split thickness graft was then harvested.  The skin graft was then placed in the primary defect and oriented appropriately.
Burow's Graft Text: The defect edges were debeveled with a #15 scalpel blade.  Given the location of the defect, shape of the defect, the proximity to free margins and the presence of a standing cone deformity a Burow's skin graft was deemed most appropriate. The standing cone was removed and this tissue was then trimmed to the shape of the primary defect. The adipose tissue was also removed until only dermis and epidermis were left.  The skin margins of the secondary defect were undermined to an appropriate distance in all directions utilizing iris scissors.  The secondary defect was closed with interrupted buried subcutaneous sutures.  The skin edges were then re-apposed with running  sutures.  The skin graft was then placed in the primary defect and oriented appropriately.
Cartilage Graft Text: The defect edges were debeveled with a #15 scalpel blade.  Given the location of the defect, shape of the defect, the fact the defect involved a full thickness cartilage defect a cartilage graft was deemed most appropriate.  An appropriate donor site was identified, cleansed, and anesthetized. The cartilage graft was then harvested and transferred to the recipient site, oriented appropriately and then sutured into place.  The secondary defect was then repaired using a primary closure.
Composite Graft Text: The defect edges were debeveled with a #15 scalpel blade.  Given the location of the defect, shape of the defect, the proximity to free margins and the fact the defect was full thickness a composite graft was deemed most appropriate.  The defect was outline and then transferred to the donor site.  A full thickness graft was then excised from the donor site. The graft was then placed in the primary defect, oriented appropriately and then sutured into place.  The secondary defect was then repaired using a primary closure.
Epidermal Autograft Text: The defect edges were debeveled with a #15 scalpel blade.  Given the location of the defect, shape of the defect and the proximity to free margins an epidermal autograft was deemed most appropriate.  Using a sterile surgical marker, the primary defect shape was transferred to the donor site. The epidermal graft was then harvested.  The skin graft was then placed in the primary defect and oriented appropriately.
Dermal Autograft Text: The defect edges were debeveled with a #15 scalpel blade.  Given the location of the defect, shape of the defect and the proximity to free margins a dermal autograft was deemed most appropriate.  Using a sterile surgical marker, the primary defect shape was transferred to the donor site. The area thus outlined was incised deep to adipose tissue with a #15 scalpel blade.  The harvested graft was then trimmed of adipose and epidermal tissue until only dermis was left.  The skin graft was then placed in the primary defect and oriented appropriately.
Skin Substitute Text: The defect edges were debeveled with a #15 scalpel blade.  Given the location of the defect, shape of the defect and the proximity to free margins a skin substitute graft was deemed most appropriate.  The graft material was trimmed to fit the size of the defect. The graft was then placed in the primary defect and oriented appropriately.
Tissue Cultured Epidermal Autograft Text: The defect edges were debeveled with a #15 scalpel blade.  Given the location of the defect, shape of the defect and the proximity to free margins a tissue cultured epidermal autograft was deemed most appropriate.  The graft was then trimmed to fit the size of the defect.  The graft was then placed in the primary defect and oriented appropriately.
Xenograft Text: The defect edges were debeveled with a #15 scalpel blade.  Given the location of the defect, shape of the defect and the proximity to free margins a xenograft was deemed most appropriate.  The graft was then trimmed to fit the size of the defect.  The graft was then placed in the primary defect and oriented appropriately.
Purse String (Simple) Text: Given the location of the defect and the characteristics of the surrounding skin a purse string closure was deemed most appropriate.  Undermining was performed circumfirentially around the surgical defect.  A purse string suture was then placed and tightened.
Purse String (Intermediate) Text: Given the location of the defect and the characteristics of the surrounding skin a purse string intermediate closure was deemed most appropriate.  Undermining was performed circumfirentially around the surgical defect.  A purse string suture was then placed and tightened.
Partial Purse String (Simple) Text: Given the location of the defect and the characteristics of the surrounding skin a simple purse string closure was deemed most appropriate.  Undermining was performed circumfirentially around the surgical defect.  A purse string suture was then placed and tightened. Wound tension only allowed a partial closure of the circular defect.
Partial Purse String (Intermediate) Text: Given the location of the defect and the characteristics of the surrounding skin an intermediate purse string closure was deemed most appropriate.  Undermining was performed circumfirentially around the surgical defect.  A purse string suture was then placed and tightened. Wound tension only allowed a partial closure of the circular defect.
Localized Dermabrasion With Wire Brush Text: The patient was draped in routine manner.  Localized dermabrasion using 3 x 17 mm wire brush was performed in routine manner to papillary dermis. This spot dermabrasion is being performed to complete skin cancer reconstruction. It also will eliminate the other sun damaged precancerous cells that are known to be part of the regional effect of a lifetime's worth of sun exposure. This localized dermabrasion is therapeutic and should not be considered cosmetic in any regard.
Tarsorrhaphy Text: A tarsorrhaphy was performed using Frost sutures.
Complex Repair And Flap Additional Text (Will Appearing After The Standard Complex Repair Text): The complex repair was not sufficient to completely close the primary defect. The remaining additional defect was repaired with the flap mentioned below.
Complex Repair And Graft Additional Text (Will Appearing After The Standard Complex Repair Text): The complex repair was not sufficient to completely close the primary defect. The remaining additional defect was repaired with the graft mentioned below.
Manual Repair Warning Statement: We plan on removing the manually selected variable below in favor of our much easier automatic structured text blocks found in the previous tab. We decided to do this to help make the flow better and give you the full power of structured data. Manual selection is never going to be ideal in our platform and I would encourage you to avoid using manual selection from this point on, especially since I will be sunsetting this feature. It is important that you do one of two things with the customized text below. First, you can save all of the text in a word file so you can have it for future reference. Second, transfer the text to the appropriate area in the Library tab. Lastly, if there is a flap or graft type which we do not have you need to let us know right away so I can add it in before the variable is hidden. No need to panic, we plan to give you roughly 6 months to make the change.
Same Histology In Subsequent Stages Text: The pattern and morphology of the tumor is as described in the first stage.
No Residual Tumor Seen Histology Text: There were no malignant cells seen in the sections examined.
Inflammation Suggestive Of Cancer Camouflage Histology Text: There was a dense lymphocytic infiltrate which prevented adequate histologic evaluation of adjacent structures.
Bcc Histology Text: There were numerous aggregates of basaloid cells.
Bcc Infiltrative Histology Text: There were numerous aggregates of basaloid cells demonstrating an infiltrative pattern.
Mart-1 - Positive Histology Text: MART-1 staining demonstrates areas of higher density and clustering of melanocytes with Pagetoid spread upwards within the epidermis. The surgical margins are positive for tumor cells.
Mart-1 - Negative Histology Text: MART-1 staining demonstrates a normal density and pattern of melanocytes along the dermal-epidermal junction. The surgical margins are negative for tumor cells.
ia Id #: [id number]
Mohs : Jeovany Cueva MD
Information: Selecting Yes will display possible errors in your note based on the variables you have selected. This validation is only offered as a suggestion for you. PLEASE NOTE THAT THE VALIDATION TEXT WILL BE REMOVED WHEN YOU FINALIZE YOUR NOTE. IF YOU WANT TO FAX A PRELIMINARY NOTE YOU WILL NEED TO TOGGLE THIS TO 'NO' IF YOU DO NOT WANT IT IN YOUR FAXED NOTE.

## 2021-06-30 ENCOUNTER — APPOINTMENT (RX ONLY)
Dept: URBAN - NONMETROPOLITAN AREA CLINIC 9 | Facility: CLINIC | Age: 56
Setting detail: DERMATOLOGY
End: 2021-06-30

## 2021-06-30 DIAGNOSIS — Z48.817 ENCOUNTER FOR SURGICAL AFTERCARE FOLLOWING SURGERY ON THE SKIN AND SUBCUTANEOUS TISSUE: ICD-10-CM

## 2021-06-30 PROCEDURE — ? POST-OP WOUND EVALUATION

## 2021-06-30 ASSESSMENT — LOCATION DETAILED DESCRIPTION DERM: LOCATION DETAILED: LEFT DISTAL DORSAL FOREARM

## 2021-06-30 ASSESSMENT — LOCATION ZONE DERM: LOCATION ZONE: ARM

## 2021-06-30 ASSESSMENT — LOCATION SIMPLE DESCRIPTION DERM: LOCATION SIMPLE: LEFT FOREARM

## 2021-06-30 NOTE — PROCEDURE: POST-OP WOUND EVALUATION
Detail Level: Detailed
Add 70635 Cpt? (Important Note: In 2017 The Use Of 13081 Is Being Tracked By Cms To Determine Future Global Period Reimbursement For Global Periods): no
Wound Diameter In Cm(Optional): 0

## 2021-10-29 ENCOUNTER — APPOINTMENT (RX ONLY)
Dept: URBAN - NONMETROPOLITAN AREA CLINIC 9 | Facility: CLINIC | Age: 56
Setting detail: DERMATOLOGY
End: 2021-10-29

## 2021-10-29 DIAGNOSIS — R22.9 LOCALIZED SWELLING, MASS AND LUMP, UNSPECIFIED: ICD-10-CM

## 2021-10-29 DIAGNOSIS — Z85.828 PERSONAL HISTORY OF OTHER MALIGNANT NEOPLASM OF SKIN: ICD-10-CM

## 2021-10-29 DIAGNOSIS — L57.8 OTHER SKIN CHANGES DUE TO CHRONIC EXPOSURE TO NONIONIZING RADIATION: ICD-10-CM

## 2021-10-29 PROCEDURE — ? COUNSELING

## 2021-10-29 PROCEDURE — 99213 OFFICE O/P EST LOW 20 MIN: CPT

## 2021-10-29 ASSESSMENT — LOCATION SIMPLE DESCRIPTION DERM: LOCATION SIMPLE: LEFT FOREARM

## 2021-10-29 ASSESSMENT — LOCATION ZONE DERM: LOCATION ZONE: ARM

## 2021-10-29 ASSESSMENT — LOCATION DETAILED DESCRIPTION DERM: LOCATION DETAILED: LEFT DISTAL DORSAL FOREARM
